# Patient Record
Sex: MALE | Race: WHITE | NOT HISPANIC OR LATINO | URBAN - METROPOLITAN AREA
[De-identification: names, ages, dates, MRNs, and addresses within clinical notes are randomized per-mention and may not be internally consistent; named-entity substitution may affect disease eponyms.]

---

## 2019-10-10 ENCOUNTER — OUTPATIENT (OUTPATIENT)
Dept: OUTPATIENT SERVICES | Facility: HOSPITAL | Age: 74
LOS: 1 days | End: 2019-10-10
Payer: MEDICARE

## 2019-10-10 ENCOUNTER — APPOINTMENT (OUTPATIENT)
Dept: MRI IMAGING | Facility: CLINIC | Age: 74
End: 2019-10-10
Payer: MEDICARE

## 2019-10-10 DIAGNOSIS — Z98.89 OTHER SPECIFIED POSTPROCEDURAL STATES: Chronic | ICD-10-CM

## 2019-10-10 DIAGNOSIS — Z90.81 ACQUIRED ABSENCE OF SPLEEN: Chronic | ICD-10-CM

## 2019-10-10 DIAGNOSIS — Z00.8 ENCOUNTER FOR OTHER GENERAL EXAMINATION: ICD-10-CM

## 2019-10-10 PROCEDURE — 72197 MRI PELVIS W/O & W/DYE: CPT | Mod: 26

## 2019-10-10 PROCEDURE — A9585: CPT

## 2019-10-10 PROCEDURE — 72197 MRI PELVIS W/O & W/DYE: CPT

## 2020-05-18 ENCOUNTER — APPOINTMENT (OUTPATIENT)
Dept: PULMONOLOGY | Facility: CLINIC | Age: 75
End: 2020-05-18
Payer: MEDICARE

## 2020-05-18 VITALS
BODY MASS INDEX: 37.61 KG/M2 | WEIGHT: 234 LBS | HEIGHT: 66 IN | HEART RATE: 78 BPM | SYSTOLIC BLOOD PRESSURE: 140 MMHG | DIASTOLIC BLOOD PRESSURE: 80 MMHG | OXYGEN SATURATION: 95 %

## 2020-05-18 DIAGNOSIS — Z86.39 PERSONAL HISTORY OF OTHER ENDOCRINE, NUTRITIONAL AND METABOLIC DISEASE: ICD-10-CM

## 2020-05-18 PROCEDURE — 99204 OFFICE O/P NEW MOD 45 MIN: CPT

## 2020-05-18 RX ORDER — TAMSULOSIN HYDROCHLORIDE 0.4 MG/1
0.4 CAPSULE ORAL
Refills: 0 | Status: ACTIVE | COMMUNITY

## 2020-05-18 RX ORDER — FLUTICASONE FUROATE AND VILANTEROL TRIFENATATE 100; 25 UG/1; UG/1
100-25 POWDER RESPIRATORY (INHALATION)
Refills: 0 | Status: ACTIVE | COMMUNITY

## 2020-05-18 RX ORDER — ATORVASTATIN CALCIUM 40 MG/1
40 TABLET, FILM COATED ORAL
Refills: 0 | Status: ACTIVE | COMMUNITY

## 2020-05-18 RX ORDER — ALBUTEROL SULFATE 90 UG/1
108 (90 BASE) AEROSOL, METERED RESPIRATORY (INHALATION)
Refills: 0 | Status: ACTIVE | COMMUNITY

## 2020-05-18 RX ORDER — UBIDECARENONE 100 MG
100 CAPSULE ORAL
Refills: 0 | Status: ACTIVE | COMMUNITY

## 2020-05-18 RX ORDER — METFORMIN HYDROCHLORIDE 500 MG/1
500 TABLET, COATED ORAL
Refills: 0 | Status: ACTIVE | COMMUNITY

## 2020-06-02 ENCOUNTER — OUTPATIENT (OUTPATIENT)
Dept: OUTPATIENT SERVICES | Facility: HOSPITAL | Age: 75
LOS: 1 days | End: 2020-06-02
Payer: MEDICARE

## 2020-06-02 DIAGNOSIS — Z98.89 OTHER SPECIFIED POSTPROCEDURAL STATES: Chronic | ICD-10-CM

## 2020-06-02 DIAGNOSIS — G47.33 OBSTRUCTIVE SLEEP APNEA (ADULT) (PEDIATRIC): ICD-10-CM

## 2020-06-02 DIAGNOSIS — Z90.81 ACQUIRED ABSENCE OF SPLEEN: Chronic | ICD-10-CM

## 2020-06-02 PROCEDURE — 95806 SLEEP STUDY UNATT&RESP EFFT: CPT | Mod: 26

## 2020-06-02 PROCEDURE — 95806 SLEEP STUDY UNATT&RESP EFFT: CPT

## 2020-06-02 PROCEDURE — G0399: CPT

## 2020-06-09 ENCOUNTER — APPOINTMENT (OUTPATIENT)
Dept: PULMONOLOGY | Facility: CLINIC | Age: 75
End: 2020-06-09
Payer: MEDICARE

## 2020-06-09 VITALS — DIASTOLIC BLOOD PRESSURE: 70 MMHG | WEIGHT: 231 LBS | BODY MASS INDEX: 37.28 KG/M2 | SYSTOLIC BLOOD PRESSURE: 132 MMHG

## 2020-06-09 VITALS — OXYGEN SATURATION: 95 % | HEART RATE: 76 BPM

## 2020-06-09 PROCEDURE — 99214 OFFICE O/P EST MOD 30 MIN: CPT

## 2020-08-12 ENCOUNTER — APPOINTMENT (OUTPATIENT)
Dept: PULMONOLOGY | Facility: CLINIC | Age: 75
End: 2020-08-12
Payer: MEDICARE

## 2020-08-12 VITALS — HEART RATE: 89 BPM | OXYGEN SATURATION: 95 %

## 2020-08-12 VITALS — WEIGHT: 229 LBS | DIASTOLIC BLOOD PRESSURE: 68 MMHG | BODY MASS INDEX: 36.96 KG/M2 | SYSTOLIC BLOOD PRESSURE: 126 MMHG

## 2020-08-12 PROCEDURE — 99214 OFFICE O/P EST MOD 30 MIN: CPT

## 2020-10-15 ENCOUNTER — RECORD ABSTRACTING (OUTPATIENT)
Age: 75
End: 2020-10-15

## 2020-10-15 DIAGNOSIS — Z84.89 FAMILY HISTORY OF OTHER SPECIFIED CONDITIONS: ICD-10-CM

## 2020-10-15 DIAGNOSIS — Z87.19 PERSONAL HISTORY OF OTHER DISEASES OF THE DIGESTIVE SYSTEM: ICD-10-CM

## 2020-10-19 ENCOUNTER — APPOINTMENT (OUTPATIENT)
Dept: PULMONOLOGY | Facility: CLINIC | Age: 75
End: 2020-10-19
Payer: MEDICARE

## 2020-10-19 VITALS — SYSTOLIC BLOOD PRESSURE: 134 MMHG | DIASTOLIC BLOOD PRESSURE: 82 MMHG | BODY MASS INDEX: 37.28 KG/M2 | WEIGHT: 231 LBS

## 2020-10-19 VITALS — HEART RATE: 69 BPM | OXYGEN SATURATION: 95 %

## 2020-10-19 PROCEDURE — 99214 OFFICE O/P EST MOD 30 MIN: CPT

## 2021-01-22 ENCOUNTER — APPOINTMENT (OUTPATIENT)
Dept: PULMONOLOGY | Facility: CLINIC | Age: 76
End: 2021-01-22
Payer: MEDICARE

## 2021-01-22 VITALS
DIASTOLIC BLOOD PRESSURE: 70 MMHG | SYSTOLIC BLOOD PRESSURE: 130 MMHG | HEART RATE: 74 BPM | WEIGHT: 228 LBS | BODY MASS INDEX: 36.64 KG/M2 | OXYGEN SATURATION: 96 % | HEIGHT: 66 IN

## 2021-01-22 PROCEDURE — 99214 OFFICE O/P EST MOD 30 MIN: CPT

## 2021-04-09 NOTE — REASON FOR VISIT
[Home] : at home, [unfilled] , at the time of the visit. [Medical Office: ([facility])___] : at the medical office located in  [Verbal consent obtained from patient] : the patient, [unfilled] [Routine Follow-Up] : a routine follow-up visit for prostate cancer

## 2021-04-13 RX ORDER — OXYBUTYNIN CHLORIDE 10 MG/1
10 TABLET, EXTENDED RELEASE ORAL
Refills: 0 | Status: DISCONTINUED | COMMUNITY
Start: 2021-01-22 | End: 2021-04-13

## 2021-04-13 NOTE — REVIEW OF SYSTEMS
[Hematuria: Grade 0] : Hematuria: Grade 0 [Urinary Incontinence: Grade 0] : Urinary Incontinence: Grade 0  [Urinary Retention: Grade 0] : Urinary Retention: Grade 0 [Urinary Tract Pain: Grade 0] : Urinary Tract Pain: Grade 0 [Urinary Urgency: Grade 1 - Present] : Urinary Urgency: Grade 1 - Present [Urinary Frequency: Grade 1 - Present] : Urinary Frequency: Grade 1 - Present

## 2021-04-14 ENCOUNTER — APPOINTMENT (OUTPATIENT)
Dept: RADIATION ONCOLOGY | Facility: CLINIC | Age: 76
End: 2021-04-14
Payer: MEDICARE

## 2021-04-14 PROCEDURE — 99213 OFFICE O/P EST LOW 20 MIN: CPT | Mod: 95

## 2021-04-14 NOTE — DISEASE MANAGEMENT
[1] : T1 [c] : c [0] : M0 [0-10] : 0 -10 ng/mL [6] : [place] Score 6 [I] : I [Radiation Therapy] : Radiation Therapy [Treatment with radiation therapy] : Treatment with radiation therapy [BiopsyDate] : 2016 [TotalCores] : 12 [TotalPositiveCores] : 4 [RadiationCompletedDate] : 10/19 [EBRTFractions] : 5

## 2021-04-14 NOTE — HISTORY OF PRESENT ILLNESS
[FreeTextEntry1] :   This 74 year old male was initially diagnosed with carcinoma of the prostate in 2012. At that time a biopsy showed adenocarcinoma, [place] Score 2+3. He chose active surveillance. A repeat biopsy was done in 2016 when his PSA som to 4. 46. Then 4/12 cores were positive for [place] 6 cancer. Now his PSA has once again risen, to 6. 3 ng/ml. Definitive radiation therapy was prescribed; treated with Cyberknife completed 10-25-19. \par \par PSA on 2-21-20 was 2. 4\par PSA on 6-3-20  was 2.42\par PSA on 10-14-20 was 2.24\par \par He presents for a one year 6 month telehealth follow up. Patient currently reports feeling well. Urinary frequency and nocturia noted. He is taking Flomax 0.8 mg qhs. Patietn has diabetes.  Urinary urgency noted. Denies hematuria or dysuria. Denies GI symptoms. Fatigue noted. Appetite is good. He recently had his PSA drawn with his urologist (which was 1.86 as per patient) and states he is seeing his PCP next week and will be having his PSA drawn again. PCP to fax PSA results to our office.  Patient received covid vaccine in February 2021.\par \par

## 2021-07-20 ENCOUNTER — APPOINTMENT (OUTPATIENT)
Dept: PULMONOLOGY | Facility: CLINIC | Age: 76
End: 2021-07-20
Payer: MEDICARE

## 2021-07-20 VITALS — HEART RATE: 66 BPM | DIASTOLIC BLOOD PRESSURE: 82 MMHG | SYSTOLIC BLOOD PRESSURE: 140 MMHG | OXYGEN SATURATION: 95 %

## 2021-07-20 PROCEDURE — 99214 OFFICE O/P EST MOD 30 MIN: CPT

## 2021-10-09 DIAGNOSIS — Z01.818 ENCOUNTER FOR OTHER PREPROCEDURAL EXAMINATION: ICD-10-CM

## 2021-10-10 ENCOUNTER — APPOINTMENT (OUTPATIENT)
Dept: DISASTER EMERGENCY | Facility: CLINIC | Age: 76
End: 2021-10-10

## 2021-10-11 LAB — SARS-COV-2 N GENE NPH QL NAA+PROBE: NOT DETECTED

## 2021-10-13 ENCOUNTER — APPOINTMENT (OUTPATIENT)
Dept: RADIATION ONCOLOGY | Facility: CLINIC | Age: 76
End: 2021-10-13

## 2021-10-13 ENCOUNTER — APPOINTMENT (OUTPATIENT)
Dept: RADIATION ONCOLOGY | Facility: CLINIC | Age: 76
End: 2021-10-13
Payer: MEDICARE

## 2021-10-13 VITALS
DIASTOLIC BLOOD PRESSURE: 70 MMHG | WEIGHT: 233 LBS | SYSTOLIC BLOOD PRESSURE: 140 MMHG | BODY MASS INDEX: 37.45 KG/M2 | HEIGHT: 66 IN

## 2021-10-13 DIAGNOSIS — C61 MALIGNANT NEOPLASM OF PROSTATE: ICD-10-CM

## 2021-10-13 PROCEDURE — 99213 OFFICE O/P EST LOW 20 MIN: CPT

## 2021-10-13 NOTE — DISEASE MANAGEMENT
[BiopsyDate] : 2016 [TotalCores] : 12 [TotalPositiveCores] : 4 [RadiationCompletedDate] : 10/19 [EBRTFractions] : 5

## 2021-10-13 NOTE — HISTORY OF PRESENT ILLNESS
[FreeTextEntry1] :   This 75 year old male was initially diagnosed with carcinoma of the prostate in 2012. At that time a biopsy showed adenocarcinoma, [place] Score 2+3. He chose active surveillance. A repeat biopsy was done in 2016 when his PSA som to 4. 46. Then 4/12 cores were positive for [place] 6 cancer. Now his PSA has once again risen, to 6. 3 ng/ml. Definitive radiation therapy was prescribed; treated with Cyberknife completed a total dose of 3500 cGy in 5 fx on 10-25-19. \par \par PSA on 2-21-20 was 2. 4\par PSA on 6-3-20  was 2.42\par PSA on 10-14-20 was 2.24\par PSA on 4-20-21 was 2.00 (drawn by PCP)\par \par He presents for a 2 year follow up. Patient currently reports feeling well. Urinary frequency noted. Reports nocturia x 1. Urinary urgency noted. Denies hematuria or dysuria. Denies GI symptoms. He is taking Flomax 0.8 mg qhs. Patient has diabetes. Fatigue noted. Appetite is good. Patient received covid vaccine in February 2021 and recently received his booster vaccine. \par \par

## 2021-10-13 NOTE — REVIEW OF SYSTEMS
[IPSS Score (0-40): ___] : IPSS score: [unfilled] [EPIC-CP Score (0-60): ___] : EPIC-CP score: [unfilled] [Hematuria: Grade 0] : Hematuria: Grade 0 [Urinary Incontinence: Grade 0] : Urinary Incontinence: Grade 0  [Urinary Retention: Grade 0] : Urinary Retention: Grade 0 [Urinary Tract Pain: Grade 0] : Urinary Tract Pain: Grade 0 [Urinary Urgency: Grade 1 - Present] : Urinary Urgency: Grade 1 - Present [Urinary Frequency: Grade 1 - Present] : Urinary Frequency: Grade 1 - Present

## 2021-10-14 ENCOUNTER — OUTPATIENT (OUTPATIENT)
Dept: OUTPATIENT SERVICES | Facility: HOSPITAL | Age: 76
LOS: 1 days | End: 2021-10-14
Payer: MEDICARE

## 2021-10-14 ENCOUNTER — APPOINTMENT (OUTPATIENT)
Age: 76
End: 2021-10-14
Payer: MEDICARE

## 2021-10-14 DIAGNOSIS — Z90.81 ACQUIRED ABSENCE OF SPLEEN: Chronic | ICD-10-CM

## 2021-10-14 DIAGNOSIS — Z98.89 OTHER SPECIFIED POSTPROCEDURAL STATES: Chronic | ICD-10-CM

## 2021-10-14 DIAGNOSIS — G47.33 OBSTRUCTIVE SLEEP APNEA (ADULT) (PEDIATRIC): ICD-10-CM

## 2021-10-14 PROCEDURE — 95811 POLYSOM 6/>YRS CPAP 4/> PARM: CPT | Mod: 26

## 2021-10-14 PROCEDURE — 95811 POLYSOM 6/>YRS CPAP 4/> PARM: CPT

## 2021-10-18 LAB — PSA SERPL-MCNC: 1.83 NG/ML

## 2021-11-09 ENCOUNTER — APPOINTMENT (OUTPATIENT)
Dept: PULMONOLOGY | Facility: CLINIC | Age: 76
End: 2021-11-09
Payer: MEDICARE

## 2021-11-09 PROCEDURE — 99443: CPT | Mod: 95

## 2022-02-02 ENCOUNTER — APPOINTMENT (OUTPATIENT)
Dept: PULMONOLOGY | Facility: CLINIC | Age: 77
End: 2022-02-02
Payer: MEDICARE

## 2022-02-02 VITALS
RESPIRATION RATE: 16 BRPM | WEIGHT: 232 LBS | BODY MASS INDEX: 36.41 KG/M2 | HEIGHT: 67 IN | DIASTOLIC BLOOD PRESSURE: 86 MMHG | SYSTOLIC BLOOD PRESSURE: 138 MMHG | HEART RATE: 68 BPM | OXYGEN SATURATION: 97 %

## 2022-02-02 PROCEDURE — 99214 OFFICE O/P EST MOD 30 MIN: CPT

## 2022-05-04 ENCOUNTER — APPOINTMENT (OUTPATIENT)
Dept: PULMONOLOGY | Facility: CLINIC | Age: 77
End: 2022-05-04
Payer: MEDICARE

## 2022-05-04 VITALS — BODY MASS INDEX: 36.96 KG/M2 | HEIGHT: 66 IN | WEIGHT: 230 LBS

## 2022-05-04 VITALS
SYSTOLIC BLOOD PRESSURE: 142 MMHG | HEART RATE: 64 BPM | OXYGEN SATURATION: 96 % | RESPIRATION RATE: 16 BRPM | DIASTOLIC BLOOD PRESSURE: 78 MMHG

## 2022-05-04 PROCEDURE — 85018 HEMOGLOBIN: CPT | Mod: QW

## 2022-05-04 PROCEDURE — 94729 DIFFUSING CAPACITY: CPT

## 2022-05-04 PROCEDURE — 94010 BREATHING CAPACITY TEST: CPT

## 2022-05-04 PROCEDURE — 94727 GAS DIL/WSHOT DETER LNG VOL: CPT

## 2022-05-04 PROCEDURE — 99214 OFFICE O/P EST MOD 30 MIN: CPT | Mod: 25

## 2022-10-12 ENCOUNTER — APPOINTMENT (OUTPATIENT)
Dept: RADIATION ONCOLOGY | Facility: CLINIC | Age: 77
End: 2022-10-12

## 2022-10-12 ENCOUNTER — NON-APPOINTMENT (OUTPATIENT)
Age: 77
End: 2022-10-12

## 2022-10-12 VITALS
SYSTOLIC BLOOD PRESSURE: 138 MMHG | WEIGHT: 232 LBS | HEIGHT: 66 IN | BODY MASS INDEX: 37.28 KG/M2 | DIASTOLIC BLOOD PRESSURE: 88 MMHG

## 2022-10-12 PROCEDURE — 99214 OFFICE O/P EST MOD 30 MIN: CPT

## 2022-10-12 NOTE — HISTORY OF PRESENT ILLNESS
[FreeTextEntry1] :   This 76 year old male was initially diagnosed with carcinoma of the prostate in 2012. At that time a biopsy showed adenocarcinoma, [place] Score 2+3. He chose active surveillance. A repeat biopsy was done in 2016 when his PSA som to 4. 46. Then 4/12 cores were positive for [place] 6 cancer. Now his PSA has once again risen, to 6. 3 ng/ml. Definitive radiation therapy was prescribed; treated with Cyberknife completed a total dose of 3500 cGy in 5 fx on 10-25-19 under the direction of Dr. Feliberto Borja. \par \par PSA on 2-21-20 was 2. 4\par PSA on 6-3-20  was 2.42\par PSA on 10-14-20 was 2.24\par PSA on 4-20-21 was 2.00 (drawn by PCP)\par PSA on 10-18-21 was 1.83\par PSA in 03/2022 was 0.63 \par PSA in 09/2022 was 0.40 \par \par He presents for a 3 year follow up. Patient currently reports feeling well. Urinary frequency noted. Reports nocturia x 1. Urinary urgency noted. Denies hematuria or dysuria. Denies GI symptoms. He is taking Flomax 0.8 mg qhs. Patient has diabetes. Appetite and energy levels are good. He is up to date on his covid vaccinations. \par \par

## 2022-11-04 ENCOUNTER — APPOINTMENT (OUTPATIENT)
Dept: PULMONOLOGY | Facility: CLINIC | Age: 77
End: 2022-11-04

## 2022-11-04 VITALS
HEIGHT: 66 IN | SYSTOLIC BLOOD PRESSURE: 130 MMHG | HEART RATE: 64 BPM | OXYGEN SATURATION: 97 % | BODY MASS INDEX: 37.45 KG/M2 | WEIGHT: 233 LBS | DIASTOLIC BLOOD PRESSURE: 72 MMHG | RESPIRATION RATE: 16 BRPM

## 2022-11-04 PROCEDURE — 99214 OFFICE O/P EST MOD 30 MIN: CPT

## 2022-11-04 NOTE — HISTORY OF PRESENT ILLNESS
[Former] : former [TextBox_4] : Hx COPD. \par \par On breo. Never needs albuterol. \par \par At baseline.  Difficulty with heavy exertion but he does not feel breathing is problem; more stamina, fatigue issue. Not needing rescue inhalers. \par \par COVID on 1/9/22; found on home rapid test; he got chills. Not hospitalized. Mild case. Now back to baseline. \par \par F/U ct chest done 5/20/20 w/o change c/w 2018.\par \par Has issues with nasal congestion. Has an ENT. \par \par Very severe ANDREW. Therapeutic AHI on  max CPAP was elevated. Went for BPAP titration 10/14. Even on max pressure tested that night, still with ANDREW. Has AutoBPAP 25/16. Doing very well on autoBPAP; therapeutic AHI 5.2  Goes to bed 9p; OOB 6a. Up frequently throughout the night. \par \par No fever. No CP.\par \par Hx prostate CA; treated with cyberknife.\par \par Sees Dr Perla for cardio. No dz noted.\par \par  [YearQuit] : 2015 [Home] : home [BPAP:] : BPAP [TextBox_100] : 6/2/20 [TextBox_108] : 79.4 [TextBox_104] : 10/14/21 [TextBox_135] : Auto BPAP Max Ipap 25; min EPAP 16; PS 4 [TextBox_137] : 100 [TextBox_147] : 3.8

## 2022-11-04 NOTE — REVIEW OF SYSTEMS
[Obesity] : obesity [Negative] : Psychiatric [TextBox_3] : per HPI [TextBox_30] : per HPI [TextBox_44] : per HPI

## 2022-11-04 NOTE — PHYSICAL EXAM
[No Acute Distress] : no acute distress [Low Lying Soft Palate] : low lying soft palate [Elongated Uvula] : elongated uvula [Enlarged Base of the Tongue] : enlarged base of the tongue [Supple] : supple [Normal Rate/Rhythm] : normal rate/rhythm [Normal S1, S2] : normal s1, s2 [No Resp Distress] : no resp distress [No Acc Muscle Use] : no acc muscle use [Normal Rhythm and Effort] : normal rhythm and effort [Clear to Auscultation Bilaterally] : clear to auscultation bilaterally [Benign] : benign [Not Tender] : not tender [Normal Gait] : normal gait [No Clubbing] : no clubbing [No Cyanosis] : no cyanosis [No Edema] : no edema [FROM] : FROM [Normal Color/ Pigmentation] : normal color/ pigmentation [No Focal Deficits] : no focal deficits [Oriented x3] : oriented x3 [Normal Affect] : normal affect [TextBox_89] : obese

## 2023-05-04 ENCOUNTER — APPOINTMENT (OUTPATIENT)
Dept: PULMONOLOGY | Facility: CLINIC | Age: 78
End: 2023-05-04
Payer: MEDICARE

## 2023-05-04 VITALS
BODY MASS INDEX: 37.77 KG/M2 | OXYGEN SATURATION: 96 % | SYSTOLIC BLOOD PRESSURE: 128 MMHG | WEIGHT: 235 LBS | DIASTOLIC BLOOD PRESSURE: 62 MMHG | HEART RATE: 69 BPM | HEIGHT: 66 IN | RESPIRATION RATE: 16 BRPM

## 2023-05-04 DIAGNOSIS — G47.33 OBSTRUCTIVE SLEEP APNEA (ADULT) (PEDIATRIC): ICD-10-CM

## 2023-05-04 PROCEDURE — 99214 OFFICE O/P EST MOD 30 MIN: CPT | Mod: 25

## 2023-05-04 PROCEDURE — G0296 VISIT TO DETERM LDCT ELIG: CPT

## 2023-05-04 NOTE — HISTORY OF PRESENT ILLNESS
[Former] : former [>= 20 pack years] : >= 20 pack years [TextBox_4] : Hx COPD. \par \par On breo. Never needs albuterol. \par \par At baseline.  Difficulty with heavy exertion but he does not feel breathing is problem; more stamina, fatigue issue. Not needing rescue inhalers. \par \par F/U ct chest done 5/20/20 w/o change c/w 2018.\par \par Has issues with nasal congestion. Has an ENT. \par \par Very severe ANDREW. Therapeutic AHI on  max CPAP was elevated. Went for BPAP titration 10/14. Even on max pressure tested that night, still with ANDREW. Has AutoBPAP 25/16. Doing very well on autoBPAP; therapeutic AHI 4.9.  Goes to bed 9p; OOB 6a. Up frequently throughout the night. \par \par No fever. No CP.\par \par Hx prostate CA; treated with cyberknife.\par \par Sees Dr Perla for cardio. No dz noted.\par \par  [YearQuit] : 2014 [Home] : home [BPAP:] : BPAP [TextBox_100] : 6/2/20 [TextBox_108] : 79.4 [TextBox_104] : 10/14/21 [TextBox_135] : Auto BPAP Max Ipap 25; min EPAP 16; PS 4 [TextBox_137] : 100 [TextBox_147] : 4.9

## 2023-05-04 NOTE — COUNSELING
[Potential consequences of obesity discussed] : Potential consequences of obesity discussed [Benefits of weight loss discussed] : Benefits of weight loss discussed [ - Annual Lung Cancer Screening/Share Decision Making Discussion] : Annual Lung Cancer Screening/Share Decision Making Discussion. (I have advised this patient to have a Low Dose CT (LDCT) scan of the lungs and have discussed the following with the patient in a shared decision making discussion:   Benefits of Detection and Early Treatment: There is adequate evidence that annual screening for lung cancer with LDCT in a population of high-risk persons can prevent a substantial number of lung cancer–related deaths. The magnitude of benefit depends on the individual patient's risk for lung cancer, as those who are at highest risk are most likely to benefit. Screening cannot prevent most lung cancer–related deaths, and does not replace smoking cessation. Harms of Detection and Early Intervention and Treatment: The harms associated with LDCT screening include false-negative and false-positive results, incidental findings, over diagnosis, and radiation exposure. False-positive LDCT results occur in a substantial proportion of screened persons; 95% of all positive results do not lead to a diagnosis of cancer. In a high-quality screening program, further imaging can resolve most false-positive results; however, some patients may require invasive procedures. Radiation harms, including cancer resulting from cumulative exposure to radiation, vary depending on the age at the start of screening; the number of scans received; and the person's exposure to other sources of radiation, particularly other medical imaging.)

## 2023-10-18 ENCOUNTER — NON-APPOINTMENT (OUTPATIENT)
Age: 78
End: 2023-10-18

## 2023-10-18 ENCOUNTER — APPOINTMENT (OUTPATIENT)
Dept: RADIATION ONCOLOGY | Facility: CLINIC | Age: 78
End: 2023-10-18
Payer: MEDICARE

## 2023-10-18 VITALS
WEIGHT: 227 LBS | HEART RATE: 62 BPM | DIASTOLIC BLOOD PRESSURE: 68 MMHG | SYSTOLIC BLOOD PRESSURE: 122 MMHG | BODY MASS INDEX: 36.48 KG/M2 | RESPIRATION RATE: 18 BRPM | HEIGHT: 66 IN | OXYGEN SATURATION: 94 %

## 2023-10-18 PROCEDURE — 99213 OFFICE O/P EST LOW 20 MIN: CPT

## 2023-11-08 ENCOUNTER — APPOINTMENT (OUTPATIENT)
Dept: PULMONOLOGY | Facility: CLINIC | Age: 78
End: 2023-11-08
Payer: MEDICARE

## 2023-11-08 VITALS
BODY MASS INDEX: 36.16 KG/M2 | WEIGHT: 225 LBS | HEART RATE: 60 BPM | SYSTOLIC BLOOD PRESSURE: 122 MMHG | DIASTOLIC BLOOD PRESSURE: 64 MMHG | OXYGEN SATURATION: 95 % | HEIGHT: 66 IN | RESPIRATION RATE: 16 BRPM

## 2023-11-08 PROCEDURE — 99214 OFFICE O/P EST MOD 30 MIN: CPT

## 2024-05-22 ENCOUNTER — APPOINTMENT (OUTPATIENT)
Dept: PULMONOLOGY | Facility: CLINIC | Age: 79
End: 2024-05-22
Payer: MEDICARE

## 2024-05-22 VITALS — WEIGHT: 222 LBS | HEIGHT: 65.5 IN | BODY MASS INDEX: 36.54 KG/M2

## 2024-05-22 VITALS
SYSTOLIC BLOOD PRESSURE: 160 MMHG | RESPIRATION RATE: 16 BRPM | DIASTOLIC BLOOD PRESSURE: 64 MMHG | OXYGEN SATURATION: 96 % | HEART RATE: 57 BPM

## 2024-05-22 VITALS — SYSTOLIC BLOOD PRESSURE: 152 MMHG | DIASTOLIC BLOOD PRESSURE: 64 MMHG

## 2024-05-22 DIAGNOSIS — R91.1 SOLITARY PULMONARY NODULE: ICD-10-CM

## 2024-05-22 DIAGNOSIS — Z87.891 PERSONAL HISTORY OF NICOTINE DEPENDENCE: ICD-10-CM

## 2024-05-22 DIAGNOSIS — E66.9 OBESITY, UNSPECIFIED: ICD-10-CM

## 2024-05-22 DIAGNOSIS — J43.9 EMPHYSEMA, UNSPECIFIED: ICD-10-CM

## 2024-05-22 DIAGNOSIS — R06.09 OTHER FORMS OF DYSPNEA: ICD-10-CM

## 2024-05-22 DIAGNOSIS — G47.33 OBSTRUCTIVE SLEEP APNEA (ADULT) (PEDIATRIC): ICD-10-CM

## 2024-05-22 DIAGNOSIS — J44.9 CHRONIC OBSTRUCTIVE PULMONARY DISEASE, UNSPECIFIED: ICD-10-CM

## 2024-05-22 PROCEDURE — 99214 OFFICE O/P EST MOD 30 MIN: CPT | Mod: 25

## 2024-05-22 PROCEDURE — 94010 BREATHING CAPACITY TEST: CPT

## 2024-05-22 NOTE — PLAN
[TextEntry] : Continue with breo. Albuterol (ventolin) prn.  Recc pulm rehab but he does not want to go.  Continue autoBPAP. Likes [facility]. Reviewed use of bpap for ANDREW. LDCT 2025. Weight loss a must. Cardiology f/u.  Annual flu vaccine. All questions answered.

## 2024-05-22 NOTE — HISTORY OF PRESENT ILLNESS
[Former] : former [>= 20 pack years] : >= 20 pack years [Home] : home [BPAP:] : BPAP [TextBox_4] : Hx COPD.   On breo. Never needs albuterol.   At baseline.  Feels sob not a problem. No wheeze, cough.    F/U ct chest done 4/23/24 reviewed below.    Has issues with nasal congestion. Has an ENT.   Very severe ANDREW. Therapeutic AHI on  max CPAP was elevated. Went for BPAP titration 10/14. Even on max pressure tested that night, still with ANDREW. Has AutoBPAP 25/16. Doing very well on autoBPAP; therapeutic AHI WNL.     No fever. No CP.  Hx prostate CA; treated with cyberknife.  Sees Dr Perla for cardio. No dz noted.   [YearQuit] : 2014 [TextBox_100] : 6/2/20 [TextBox_108] : 79.4 [TextBox_104] : 10/14/21 [TextBox_135] : Auto BPAP Max Ipap 25; min EPAP 16; PS 4 [TextBox_137] : 100 [TextBox_147] : 3.5 [TextBox_158] : [employer]

## 2024-05-22 NOTE — PROCEDURE
[FreeTextEntry1] : compliance reviewed  ila today 5/22/24: obstruction in the mod range; improved FEV1 from 5/2022 ------  ct done 4/23/24 reviewed

## 2024-10-23 ENCOUNTER — APPOINTMENT (OUTPATIENT)
Dept: RADIATION ONCOLOGY | Facility: CLINIC | Age: 79
End: 2024-10-23
Payer: MEDICARE

## 2024-10-23 VITALS
WEIGHT: 218 LBS | SYSTOLIC BLOOD PRESSURE: 163 MMHG | BODY MASS INDEX: 35.03 KG/M2 | OXYGEN SATURATION: 96 % | RESPIRATION RATE: 16 BRPM | HEIGHT: 66 IN | DIASTOLIC BLOOD PRESSURE: 74 MMHG | HEART RATE: 59 BPM

## 2024-10-23 VITALS — DIASTOLIC BLOOD PRESSURE: 82 MMHG | SYSTOLIC BLOOD PRESSURE: 136 MMHG

## 2024-10-23 PROCEDURE — 99213 OFFICE O/P EST LOW 20 MIN: CPT

## 2024-10-23 PROCEDURE — G2211 COMPLEX E/M VISIT ADD ON: CPT

## 2024-11-11 ENCOUNTER — APPOINTMENT (OUTPATIENT)
Dept: UROLOGY | Facility: CLINIC | Age: 79
End: 2024-11-11
Payer: MEDICARE

## 2024-11-11 VITALS
TEMPERATURE: 98.5 F | HEIGHT: 66 IN | DIASTOLIC BLOOD PRESSURE: 76 MMHG | BODY MASS INDEX: 34.87 KG/M2 | RESPIRATION RATE: 16 BRPM | HEART RATE: 67 BPM | OXYGEN SATURATION: 95 % | SYSTOLIC BLOOD PRESSURE: 156 MMHG | WEIGHT: 217 LBS

## 2024-11-11 DIAGNOSIS — Z87.891 PERSONAL HISTORY OF NICOTINE DEPENDENCE: ICD-10-CM

## 2024-11-11 DIAGNOSIS — Z80.9 FAMILY HISTORY OF MALIGNANT NEOPLASM, UNSPECIFIED: ICD-10-CM

## 2024-11-11 DIAGNOSIS — Z85.46 PERSONAL HISTORY OF MALIGNANT NEOPLASM OF PROSTATE: ICD-10-CM

## 2024-11-11 DIAGNOSIS — Z87.19 PERSONAL HISTORY OF OTHER DISEASES OF THE DIGESTIVE SYSTEM: ICD-10-CM

## 2024-11-11 DIAGNOSIS — R35.0 FREQUENCY OF MICTURITION: ICD-10-CM

## 2024-11-11 PROCEDURE — 51798 US URINE CAPACITY MEASURE: CPT

## 2024-11-11 PROCEDURE — 99204 OFFICE O/P NEW MOD 45 MIN: CPT

## 2024-11-11 PROCEDURE — 51741 ELECTRO-UROFLOWMETRY FIRST: CPT

## 2024-11-12 LAB
APPEARANCE: CLEAR
BACTERIA: NEGATIVE /HPF
BILIRUBIN URINE: NEGATIVE
BLOOD URINE: NEGATIVE
CAST: 0 /LPF
COLOR: YELLOW
EPITHELIAL CELLS: 0 /HPF
GLUCOSE QUALITATIVE U: NEGATIVE MG/DL
KETONES URINE: NEGATIVE MG/DL
LEUKOCYTE ESTERASE URINE: ABNORMAL
MICROSCOPIC-UA: NORMAL
NITRITE URINE: NEGATIVE
PH URINE: 5.5
PROTEIN URINE: 100 MG/DL
RED BLOOD CELLS URINE: 0 /HPF
SPECIFIC GRAVITY URINE: 1.02
UROBILINOGEN URINE: 0.2 MG/DL
WHITE BLOOD CELLS URINE: 0 /HPF

## 2024-11-13 LAB — BACTERIA UR CULT: NORMAL

## 2024-11-18 LAB — URINE CYTOLOGY: NORMAL

## 2024-11-19 ENCOUNTER — APPOINTMENT (OUTPATIENT)
Dept: PULMONOLOGY | Facility: CLINIC | Age: 79
End: 2024-11-19
Payer: MEDICARE

## 2024-11-19 VITALS
SYSTOLIC BLOOD PRESSURE: 130 MMHG | WEIGHT: 219 LBS | OXYGEN SATURATION: 96 % | BODY MASS INDEX: 35.2 KG/M2 | RESPIRATION RATE: 16 BRPM | HEART RATE: 72 BPM | HEIGHT: 66 IN | DIASTOLIC BLOOD PRESSURE: 64 MMHG

## 2024-11-19 DIAGNOSIS — E66.9 OBESITY, UNSPECIFIED: ICD-10-CM

## 2024-11-19 DIAGNOSIS — G47.33 OBSTRUCTIVE SLEEP APNEA (ADULT) (PEDIATRIC): ICD-10-CM

## 2024-11-19 DIAGNOSIS — J43.9 EMPHYSEMA, UNSPECIFIED: ICD-10-CM

## 2024-11-19 DIAGNOSIS — J44.9 CHRONIC OBSTRUCTIVE PULMONARY DISEASE, UNSPECIFIED: ICD-10-CM

## 2024-11-19 DIAGNOSIS — R91.1 SOLITARY PULMONARY NODULE: ICD-10-CM

## 2024-11-19 DIAGNOSIS — Z87.891 PERSONAL HISTORY OF NICOTINE DEPENDENCE: ICD-10-CM

## 2024-11-19 PROCEDURE — G2211 COMPLEX E/M VISIT ADD ON: CPT

## 2024-11-19 PROCEDURE — 99214 OFFICE O/P EST MOD 30 MIN: CPT

## 2025-03-24 ENCOUNTER — APPOINTMENT (OUTPATIENT)
Dept: UROLOGY | Facility: CLINIC | Age: 80
End: 2025-03-24
Payer: MEDICARE

## 2025-03-24 VITALS
HEART RATE: 61 BPM | HEIGHT: 66 IN | BODY MASS INDEX: 34.72 KG/M2 | WEIGHT: 216 LBS | DIASTOLIC BLOOD PRESSURE: 65 MMHG | OXYGEN SATURATION: 95 % | SYSTOLIC BLOOD PRESSURE: 161 MMHG

## 2025-03-24 DIAGNOSIS — C61 MALIGNANT NEOPLASM OF PROSTATE: ICD-10-CM

## 2025-03-24 DIAGNOSIS — R35.0 FREQUENCY OF MICTURITION: ICD-10-CM

## 2025-03-24 DIAGNOSIS — R39.15 URGENCY OF URINATION: ICD-10-CM

## 2025-03-24 PROCEDURE — 99214 OFFICE O/P EST MOD 30 MIN: CPT

## 2025-03-24 PROCEDURE — 51741 ELECTRO-UROFLOWMETRY FIRST: CPT

## 2025-03-24 PROCEDURE — 51798 US URINE CAPACITY MEASURE: CPT

## 2025-04-01 ENCOUNTER — APPOINTMENT (OUTPATIENT)
Dept: UROLOGY | Facility: CLINIC | Age: 80
End: 2025-04-01
Payer: MEDICARE

## 2025-04-01 VITALS
HEART RATE: 56 BPM | DIASTOLIC BLOOD PRESSURE: 75 MMHG | SYSTOLIC BLOOD PRESSURE: 160 MMHG | WEIGHT: 216 LBS | TEMPERATURE: 98.5 F | OXYGEN SATURATION: 98 % | BODY MASS INDEX: 34.72 KG/M2 | HEIGHT: 66 IN

## 2025-04-01 DIAGNOSIS — D49.4 NEOPLASM OF UNSPECIFIED BEHAVIOR OF BLADDER: ICD-10-CM

## 2025-04-01 LAB
BILIRUB UR QL STRIP: NEGATIVE
CLARITY UR: CLEAR
COLLECTION METHOD: NORMAL
GLUCOSE UR-MCNC: NEGATIVE
HCG UR QL: 0.2 EU/DL
HGB UR QL STRIP.AUTO: NEGATIVE
KETONES UR-MCNC: NEGATIVE
LEUKOCYTE ESTERASE UR QL STRIP: NEGATIVE
NITRITE UR QL STRIP: NEGATIVE
PH UR STRIP: 5
PROT UR STRIP-MCNC: NORMAL
SP GR UR STRIP: 1.02

## 2025-04-01 PROCEDURE — 51728 CYSTOMETROGRAM W/VP: CPT

## 2025-04-01 PROCEDURE — 51741 ELECTRO-UROFLOWMETRY FIRST: CPT

## 2025-04-01 PROCEDURE — 51784 ANAL/URINARY MUSCLE STUDY: CPT

## 2025-04-01 PROCEDURE — 52000 CYSTOURETHROSCOPY: CPT

## 2025-04-01 PROCEDURE — 99214 OFFICE O/P EST MOD 30 MIN: CPT | Mod: 25

## 2025-04-01 PROCEDURE — 76872 US TRANSRECTAL: CPT

## 2025-04-01 PROCEDURE — 51797 INTRAABDOMINAL PRESSURE TEST: CPT

## 2025-04-01 RX ORDER — SULFAMETHOXAZOLE AND TRIMETHOPRIM 800; 160 MG/1; MG/1
800-160 TABLET ORAL
Qty: 1 | Refills: 0 | Status: COMPLETED | OUTPATIENT
Start: 2025-04-01 | End: 2025-04-02

## 2025-04-01 RX ORDER — SULFAMETHOXAZOLE AND TRIMETHOPRIM 800; 160 MG/1; MG/1
800-160 TABLET ORAL
Refills: 0 | Status: COMPLETED | OUTPATIENT
Start: 2025-04-01

## 2025-04-01 RX ADMIN — SULFAMETHOXAZOLE AND TRIMETHOPRIM 0 MG: 800; 160 TABLET ORAL at 00:00

## 2025-04-11 ENCOUNTER — OUTPATIENT (OUTPATIENT)
Dept: OUTPATIENT SERVICES | Facility: HOSPITAL | Age: 80
LOS: 1 days | End: 2025-04-11
Payer: MEDICARE

## 2025-04-11 VITALS
RESPIRATION RATE: 16 BRPM | WEIGHT: 201.94 LBS | HEIGHT: 66.5 IN | TEMPERATURE: 97 F | DIASTOLIC BLOOD PRESSURE: 58 MMHG | HEART RATE: 60 BPM | OXYGEN SATURATION: 98 % | SYSTOLIC BLOOD PRESSURE: 145 MMHG

## 2025-04-11 DIAGNOSIS — Z01.818 ENCOUNTER FOR OTHER PREPROCEDURAL EXAMINATION: ICD-10-CM

## 2025-04-11 DIAGNOSIS — C67.9 MALIGNANT NEOPLASM OF BLADDER, UNSPECIFIED: ICD-10-CM

## 2025-04-11 DIAGNOSIS — J44.9 CHRONIC OBSTRUCTIVE PULMONARY DISEASE, UNSPECIFIED: ICD-10-CM

## 2025-04-11 DIAGNOSIS — D49.4 NEOPLASM OF UNSPECIFIED BEHAVIOR OF BLADDER: ICD-10-CM

## 2025-04-11 DIAGNOSIS — E11.9 TYPE 2 DIABETES MELLITUS WITHOUT COMPLICATIONS: ICD-10-CM

## 2025-04-11 DIAGNOSIS — Z90.81 ACQUIRED ABSENCE OF SPLEEN: Chronic | ICD-10-CM

## 2025-04-11 DIAGNOSIS — Z98.89 OTHER SPECIFIED POSTPROCEDURAL STATES: Chronic | ICD-10-CM

## 2025-04-11 DIAGNOSIS — I10 ESSENTIAL (PRIMARY) HYPERTENSION: ICD-10-CM

## 2025-04-11 DIAGNOSIS — G47.33 OBSTRUCTIVE SLEEP APNEA (ADULT) (PEDIATRIC): ICD-10-CM

## 2025-04-11 LAB
A1C WITH ESTIMATED AVERAGE GLUCOSE RESULT: 7 % — HIGH (ref 4–5.6)
ANION GAP SERPL CALC-SCNC: 3 MMOL/L — LOW (ref 5–17)
APPEARANCE UR: CLEAR — SIGNIFICANT CHANGE UP
BILIRUB UR-MCNC: NEGATIVE — SIGNIFICANT CHANGE UP
BUN SERPL-MCNC: 35 MG/DL — HIGH (ref 7–23)
CALCIUM SERPL-MCNC: 9 MG/DL — SIGNIFICANT CHANGE UP (ref 8.5–10.1)
CHLORIDE SERPL-SCNC: 112 MMOL/L — HIGH (ref 96–108)
CO2 SERPL-SCNC: 27 MMOL/L — SIGNIFICANT CHANGE UP (ref 22–31)
COLOR SPEC: YELLOW — SIGNIFICANT CHANGE UP
CREAT SERPL-MCNC: 1.5 MG/DL — HIGH (ref 0.5–1.3)
DIFF PNL FLD: NEGATIVE — SIGNIFICANT CHANGE UP
EGFR: 46 ML/MIN/1.73M2 — LOW
EGFR: 46 ML/MIN/1.73M2 — LOW
ESTIMATED AVERAGE GLUCOSE: 154 MG/DL — HIGH (ref 68–114)
GLUCOSE SERPL-MCNC: 134 MG/DL — HIGH (ref 70–99)
GLUCOSE UR QL: 100 MG/DL
HCT VFR BLD CALC: 36.4 % — LOW (ref 39–50)
HGB BLD-MCNC: 11.7 G/DL — LOW (ref 13–17)
KETONES UR-MCNC: NEGATIVE MG/DL — SIGNIFICANT CHANGE UP
LEUKOCYTE ESTERASE UR-ACNC: NEGATIVE — SIGNIFICANT CHANGE UP
MCHC RBC-ENTMCNC: 29.7 PG — SIGNIFICANT CHANGE UP (ref 27–34)
MCHC RBC-ENTMCNC: 32.1 G/DL — SIGNIFICANT CHANGE UP (ref 32–36)
MCV RBC AUTO: 92.4 FL — SIGNIFICANT CHANGE UP (ref 80–100)
NITRITE UR-MCNC: NEGATIVE — SIGNIFICANT CHANGE UP
NRBC BLD AUTO-RTO: 0 /100 WBCS — SIGNIFICANT CHANGE UP (ref 0–0)
PH UR: 5 — SIGNIFICANT CHANGE UP (ref 5–8)
PLATELET # BLD AUTO: 336 K/UL — SIGNIFICANT CHANGE UP (ref 150–400)
POTASSIUM SERPL-MCNC: 4.8 MMOL/L — SIGNIFICANT CHANGE UP (ref 3.5–5.3)
POTASSIUM SERPL-SCNC: 4.8 MMOL/L — SIGNIFICANT CHANGE UP (ref 3.5–5.3)
PROT UR-MCNC: 100 MG/DL
RBC # BLD: 3.94 M/UL — LOW (ref 4.2–5.8)
RBC # FLD: 15.2 % — HIGH (ref 10.3–14.5)
SODIUM SERPL-SCNC: 142 MMOL/L — SIGNIFICANT CHANGE UP (ref 135–145)
SP GR SPEC: 1.01 — SIGNIFICANT CHANGE UP (ref 1–1.03)
UROBILINOGEN FLD QL: 0.2 MG/DL — SIGNIFICANT CHANGE UP (ref 0.2–1)
WBC # BLD: 7.18 K/UL — SIGNIFICANT CHANGE UP (ref 3.8–10.5)
WBC # FLD AUTO: 7.18 K/UL — SIGNIFICANT CHANGE UP (ref 3.8–10.5)

## 2025-04-11 PROCEDURE — 87086 URINE CULTURE/COLONY COUNT: CPT

## 2025-04-11 PROCEDURE — 85027 COMPLETE CBC AUTOMATED: CPT

## 2025-04-11 PROCEDURE — G0463: CPT

## 2025-04-11 PROCEDURE — 83036 HEMOGLOBIN GLYCOSYLATED A1C: CPT

## 2025-04-11 PROCEDURE — 93010 ELECTROCARDIOGRAM REPORT: CPT

## 2025-04-11 PROCEDURE — 93005 ELECTROCARDIOGRAM TRACING: CPT

## 2025-04-11 PROCEDURE — 80048 BASIC METABOLIC PNL TOTAL CA: CPT

## 2025-04-11 PROCEDURE — 81001 URINALYSIS AUTO W/SCOPE: CPT

## 2025-04-11 PROCEDURE — 36415 COLL VENOUS BLD VENIPUNCTURE: CPT

## 2025-04-11 NOTE — H&P PST ADULT - PROBLEM SELECTOR PLAN 1
scheduled for TUR bladder tumor w/Mitomycin on 4/23/25  Labs- CBC, BMP, UA, Urine C&S, EKG  Pre op instructions discussed, [id number] understanding  Pre op PCP evaluation

## 2025-04-11 NOTE — H&P PST ADULT - NSICDXPASTMEDICALHX_GEN_ALL_CORE_FT
PAST MEDICAL HISTORY:  COPD, mild     H/O emphysema     Hyperlipidemia     Hypertension     Malignant neoplasm of bladder     Prostate ca     Type 2 diabetes mellitus

## 2025-04-11 NOTE — H&P PST ADULT - NSICDXPROCEDURE_GEN_ALL_CORE_FT
PROCEDURES:  Transurethral resection of bladder tumor (TURBT) with cystoscopy and instillation of mitomycin C 11-Apr-2025 13:38:21  Kelly Briones

## 2025-04-11 NOTE — H&P PST ADULT - HISTORY OF PRESENT ILLNESS
80 yo M with h/o HTN, prostate cancer (2012, s/p Cyber knife Rx), ANDREW on CPAP, COPD, T2 DM c/o increased frequency urination x one year. Pt had urology evaluation- s/p uroflow- demonstrated bladder neoplasm. Pt scheduled for TUR bladder tumor w/Mitomycin on 4/23/25    **Denies any CP/SOB, hematuria or sick contacts

## 2025-04-12 LAB
CULTURE RESULTS: NO GROWTH — SIGNIFICANT CHANGE UP
SPECIMEN SOURCE: SIGNIFICANT CHANGE UP

## 2025-04-13 ENCOUNTER — OUTPATIENT (OUTPATIENT)
Dept: OUTPATIENT SERVICES | Facility: HOSPITAL | Age: 80
LOS: 1 days | End: 2025-04-13
Payer: MEDICARE

## 2025-04-13 DIAGNOSIS — Z98.89 OTHER SPECIFIED POSTPROCEDURAL STATES: Chronic | ICD-10-CM

## 2025-04-13 DIAGNOSIS — Z90.81 ACQUIRED ABSENCE OF SPLEEN: Chronic | ICD-10-CM

## 2025-04-13 DIAGNOSIS — R39.15 URGENCY OF URINATION: ICD-10-CM

## 2025-04-13 PROBLEM — C67.9 MALIGNANT NEOPLASM OF BLADDER, UNSPECIFIED: Chronic | Status: ACTIVE | Noted: 2025-04-11

## 2025-04-13 PROBLEM — J44.9 CHRONIC OBSTRUCTIVE PULMONARY DISEASE, UNSPECIFIED: Chronic | Status: ACTIVE | Noted: 2025-04-11

## 2025-04-13 PROBLEM — E11.9 TYPE 2 DIABETES MELLITUS WITHOUT COMPLICATIONS: Chronic | Status: ACTIVE | Noted: 2025-04-11

## 2025-04-13 PROBLEM — J43.9 EMPHYSEMA, UNSPECIFIED: Chronic | Status: ACTIVE | Noted: 2025-04-11

## 2025-04-13 PROCEDURE — 74178 CT ABD&PLV WO CNTR FLWD CNTR: CPT

## 2025-04-22 NOTE — ASU PATIENT PROFILE, ADULT - FALL HARM RISK - HARM RISK INTERVENTIONS

## 2025-04-23 ENCOUNTER — TRANSCRIPTION ENCOUNTER (OUTPATIENT)
Age: 80
End: 2025-04-23

## 2025-04-23 ENCOUNTER — APPOINTMENT (OUTPATIENT)
Dept: UROLOGY | Facility: HOSPITAL | Age: 80
End: 2025-04-23

## 2025-04-23 ENCOUNTER — OUTPATIENT (OUTPATIENT)
Dept: OUTPATIENT SERVICES | Facility: HOSPITAL | Age: 80
LOS: 1 days | End: 2025-04-23
Payer: MEDICARE

## 2025-04-23 VITALS
WEIGHT: 205.03 LBS | RESPIRATION RATE: 18 BRPM | HEART RATE: 51 BPM | TEMPERATURE: 98 F | HEIGHT: 67 IN | SYSTOLIC BLOOD PRESSURE: 156 MMHG | DIASTOLIC BLOOD PRESSURE: 64 MMHG | OXYGEN SATURATION: 95 %

## 2025-04-23 VITALS
RESPIRATION RATE: 16 BRPM | DIASTOLIC BLOOD PRESSURE: 65 MMHG | SYSTOLIC BLOOD PRESSURE: 140 MMHG | OXYGEN SATURATION: 96 % | HEART RATE: 49 BPM

## 2025-04-23 DIAGNOSIS — Z90.81 ACQUIRED ABSENCE OF SPLEEN: Chronic | ICD-10-CM

## 2025-04-23 DIAGNOSIS — Z98.89 OTHER SPECIFIED POSTPROCEDURAL STATES: Chronic | ICD-10-CM

## 2025-04-23 DIAGNOSIS — D49.4 NEOPLASM OF UNSPECIFIED BEHAVIOR OF BLADDER: ICD-10-CM

## 2025-04-23 PROCEDURE — 52235 CYSTOSCOPY AND TREATMENT: CPT

## 2025-04-23 PROCEDURE — 82962 GLUCOSE BLOOD TEST: CPT

## 2025-04-23 PROCEDURE — 88307 TISSUE EXAM BY PATHOLOGIST: CPT | Mod: 26

## 2025-04-23 PROCEDURE — 88307 TISSUE EXAM BY PATHOLOGIST: CPT

## 2025-04-23 PROCEDURE — C9399: CPT

## 2025-04-23 RX ORDER — SODIUM CHLORIDE 9 G/1000ML
1000 INJECTION, SOLUTION INTRAVENOUS
Refills: 0 | Status: DISCONTINUED | OUTPATIENT
Start: 2025-04-23 | End: 2025-04-23

## 2025-04-23 RX ORDER — OXYCODONE HYDROCHLORIDE 30 MG/1
5 TABLET ORAL ONCE
Refills: 0 | Status: DISCONTINUED | OUTPATIENT
Start: 2025-04-23 | End: 2025-04-23

## 2025-04-23 RX ORDER — ATORVASTATIN CALCIUM 80 MG/1
1 TABLET, FILM COATED ORAL
Refills: 0 | DISCHARGE

## 2025-04-23 RX ORDER — TAMSULOSIN HYDROCHLORIDE 0.4 MG/1
2 CAPSULE ORAL
Refills: 0 | DISCHARGE

## 2025-04-23 RX ORDER — METOPROLOL SUCCINATE 50 MG/1
1 TABLET, EXTENDED RELEASE ORAL
Refills: 0 | DISCHARGE

## 2025-04-23 RX ORDER — METFORMIN HYDROCHLORIDE 850 MG/1
1 TABLET ORAL
Refills: 0 | DISCHARGE

## 2025-04-23 RX ORDER — CEPHALEXIN 250 MG/1
1 CAPSULE ORAL
Qty: 6 | Refills: 0
Start: 2025-04-23 | End: 2025-04-25

## 2025-04-23 RX ORDER — UBIDECARENONE 100 MG
1 CAPSULE ORAL
Refills: 0 | DISCHARGE

## 2025-04-23 RX ORDER — FLUTICASONE FUROATE AND VILANTEROL TRIFENATATE 100; 25 UG/1; UG/1
1 POWDER RESPIRATORY (INHALATION)
Refills: 0 | DISCHARGE

## 2025-04-23 RX ORDER — HYDROMORPHONE/SOD CHLOR,ISO/PF 2 MG/10 ML
0.5 SYRINGE (ML) INJECTION
Refills: 0 | Status: DISCONTINUED | OUTPATIENT
Start: 2025-04-23 | End: 2025-04-23

## 2025-04-23 RX ORDER — ONDANSETRON HCL/PF 4 MG/2 ML
4 VIAL (ML) INJECTION ONCE
Refills: 0 | Status: DISCONTINUED | OUTPATIENT
Start: 2025-04-23 | End: 2025-04-23

## 2025-04-23 RX ORDER — PHENAZOPYRIDINE HCL 100 MG
1 TABLET ORAL
Qty: 9 | Refills: 0
Start: 2025-04-23 | End: 2025-04-25

## 2025-04-23 RX ORDER — CEFAZOLIN SODIUM IN 0.9 % NACL 3 G/100 ML
2000 INTRAVENOUS SOLUTION, PIGGYBACK (ML) INTRAVENOUS ONCE
Refills: 0 | Status: COMPLETED | OUTPATIENT
Start: 2025-04-23 | End: 2025-04-23

## 2025-04-23 RX ADMIN — SODIUM CHLORIDE 60 MILLILITER(S): 9 INJECTION, SOLUTION INTRAVENOUS at 07:13

## 2025-04-23 RX ADMIN — SODIUM CHLORIDE 75 MILLILITER(S): 9 INJECTION, SOLUTION INTRAVENOUS at 09:34

## 2025-04-23 NOTE — ASU DISCHARGE PLAN (ADULT/PEDIATRIC) - COMMENTS
May take Tylenol ay 2:30 pm today or layer for pain or fever...you received Tylenol at 8:30 am today in the operating room.

## 2025-04-23 NOTE — ASU DISCHARGE PLAN (ADULT/PEDIATRIC) - FINANCIAL ASSISTANCE
[facility] provides services at a reduced cost to those who are determined to be eligible through [facility]’s financial assistance program. Information regarding [facility]’s financial assistance program can be found by going to [email or web address] or by calling [phone].

## 2025-04-23 NOTE — ASU DISCHARGE PLAN (ADULT/PEDIATRIC) - ASU DC SPECIAL INSTRUCTIONSFT
You may resume your regular activity level.  If any issues please call Dr. Mckeon's office.    You may drive a vehicle.    You may return to work, if still working.      You may take 2 Tylenol Extra Strength 500mg OTC   Please take Keflex 500mg twice a day as prescribed. Please take Pyridium as prescribed.     Please call Dr. Mckeon 's office for follow up in 1 week.      You are encouraged to walk as much as possible to prevent blood clots in the legs, to maintain lung health after surgery/anesthesia, and to prevent constipation after surgery.      Continue to use the incentive spirometer at home.

## 2025-05-01 ENCOUNTER — APPOINTMENT (OUTPATIENT)
Dept: UROLOGY | Facility: CLINIC | Age: 80
End: 2025-05-01
Payer: MEDICARE

## 2025-05-01 PROCEDURE — 51741 ELECTRO-UROFLOWMETRY FIRST: CPT

## 2025-05-01 PROCEDURE — 51798 US URINE CAPACITY MEASURE: CPT

## 2025-05-01 PROCEDURE — 99214 OFFICE O/P EST MOD 30 MIN: CPT

## 2025-06-03 ENCOUNTER — APPOINTMENT (OUTPATIENT)
Dept: PULMONOLOGY | Facility: CLINIC | Age: 80
End: 2025-06-03
Payer: MEDICARE

## 2025-06-03 VITALS
HEART RATE: 54 BPM | OXYGEN SATURATION: 95 % | BODY MASS INDEX: 33.43 KG/M2 | RESPIRATION RATE: 16 BRPM | SYSTOLIC BLOOD PRESSURE: 154 MMHG | DIASTOLIC BLOOD PRESSURE: 62 MMHG | WEIGHT: 208 LBS | HEIGHT: 66 IN

## 2025-06-03 DIAGNOSIS — G47.33 OBSTRUCTIVE SLEEP APNEA (ADULT) (PEDIATRIC): ICD-10-CM

## 2025-06-03 DIAGNOSIS — J44.9 CHRONIC OBSTRUCTIVE PULMONARY DISEASE, UNSPECIFIED: ICD-10-CM

## 2025-06-03 DIAGNOSIS — Z87.891 PERSONAL HISTORY OF NICOTINE DEPENDENCE: ICD-10-CM

## 2025-06-03 DIAGNOSIS — R06.09 OTHER FORMS OF DYSPNEA: ICD-10-CM

## 2025-06-03 DIAGNOSIS — E66.9 OBESITY, UNSPECIFIED: ICD-10-CM

## 2025-06-03 DIAGNOSIS — J43.9 EMPHYSEMA, UNSPECIFIED: ICD-10-CM

## 2025-06-03 PROCEDURE — 99214 OFFICE O/P EST MOD 30 MIN: CPT

## 2025-06-03 PROCEDURE — G2211 COMPLEX E/M VISIT ADD ON: CPT

## 2025-06-05 ENCOUNTER — APPOINTMENT (OUTPATIENT)
Dept: UROLOGY | Facility: CLINIC | Age: 80
End: 2025-06-05
Payer: MEDICARE

## 2025-06-05 DIAGNOSIS — R35.0 FREQUENCY OF MICTURITION: ICD-10-CM

## 2025-06-05 PROCEDURE — 51798 US URINE CAPACITY MEASURE: CPT

## 2025-06-05 PROCEDURE — 99214 OFFICE O/P EST MOD 30 MIN: CPT

## 2025-06-05 PROCEDURE — 51741 ELECTRO-UROFLOWMETRY FIRST: CPT

## 2025-06-20 ENCOUNTER — APPOINTMENT (OUTPATIENT)
Dept: UROLOGY | Facility: CLINIC | Age: 80
End: 2025-06-20
Payer: MEDICARE

## 2025-06-20 PROCEDURE — 99214 OFFICE O/P EST MOD 30 MIN: CPT

## 2025-06-20 PROCEDURE — 51741 ELECTRO-UROFLOWMETRY FIRST: CPT

## 2025-06-20 PROCEDURE — 51798 US URINE CAPACITY MEASURE: CPT

## 2025-06-20 RX ORDER — MIRABEGRON 50 MG/1
50 TABLET, EXTENDED RELEASE ORAL
Qty: 30 | Refills: 3 | Status: ACTIVE | COMMUNITY
Start: 2025-06-20 | End: 1900-01-01

## 2025-07-07 RX ORDER — TAMSULOSIN HYDROCHLORIDE 0.4 MG/1
0.4 CAPSULE ORAL
Qty: 90 | Refills: 0 | Status: ACTIVE | COMMUNITY
Start: 2025-07-07 | End: 1900-01-01

## 2025-07-25 ENCOUNTER — APPOINTMENT (OUTPATIENT)
Dept: UROLOGY | Facility: CLINIC | Age: 80
End: 2025-07-25
Payer: MEDICARE

## 2025-07-25 VITALS
HEIGHT: 66 IN | OXYGEN SATURATION: 96 % | DIASTOLIC BLOOD PRESSURE: 70 MMHG | RESPIRATION RATE: 16 BRPM | WEIGHT: 218 LBS | SYSTOLIC BLOOD PRESSURE: 153 MMHG | TEMPERATURE: 98.3 F | BODY MASS INDEX: 35.03 KG/M2 | HEART RATE: 57 BPM

## 2025-07-25 PROCEDURE — 51741 ELECTRO-UROFLOWMETRY FIRST: CPT

## 2025-07-25 PROCEDURE — 51784 ANAL/URINARY MUSCLE STUDY: CPT

## 2025-07-25 PROCEDURE — 52000 CYSTOURETHROSCOPY: CPT

## 2025-07-25 PROCEDURE — 51728 CYSTOMETROGRAM W/VP: CPT

## 2025-07-25 PROCEDURE — 99214 OFFICE O/P EST MOD 30 MIN: CPT | Mod: 25

## 2025-07-25 PROCEDURE — 51797 INTRAABDOMINAL PRESSURE TEST: CPT

## 2025-08-15 ENCOUNTER — APPOINTMENT (OUTPATIENT)
Dept: UROLOGY | Facility: CLINIC | Age: 80
End: 2025-08-15

## 2025-08-29 ENCOUNTER — APPOINTMENT (OUTPATIENT)
Dept: UROLOGY | Facility: CLINIC | Age: 80
End: 2025-08-29
Payer: MEDICARE

## 2025-08-29 VITALS
BODY MASS INDEX: 35.2 KG/M2 | WEIGHT: 219 LBS | SYSTOLIC BLOOD PRESSURE: 163 MMHG | RESPIRATION RATE: 16 BRPM | OXYGEN SATURATION: 96 % | TEMPERATURE: 98.4 F | DIASTOLIC BLOOD PRESSURE: 76 MMHG | HEART RATE: 62 BPM | HEIGHT: 66 IN

## 2025-08-29 VITALS — SYSTOLIC BLOOD PRESSURE: 112 MMHG | DIASTOLIC BLOOD PRESSURE: 62 MMHG

## 2025-08-29 VITALS — DIASTOLIC BLOOD PRESSURE: 76 MMHG | SYSTOLIC BLOOD PRESSURE: 153 MMHG | OXYGEN SATURATION: 95 % | HEART RATE: 57 BPM

## 2025-08-29 VITALS — DIASTOLIC BLOOD PRESSURE: 70 MMHG | SYSTOLIC BLOOD PRESSURE: 142 MMHG

## 2025-08-29 PROCEDURE — 99214 OFFICE O/P EST MOD 30 MIN: CPT | Mod: 25

## 2025-08-29 PROCEDURE — 64566 NEUROELTRD STIM POST TIBIAL: CPT

## 2025-08-29 RX ORDER — SULFAMETHOXAZOLE AND TRIMETHOPRIM 800; 160 MG/1; MG/1
800-160 TABLET ORAL
Qty: 1 | Refills: 0 | Status: COMPLETED | OUTPATIENT
Start: 2025-08-29 | End: 2025-08-30

## 2025-08-29 RX ORDER — SULFAMETHOXAZOLE AND TRIMETHOPRIM 800; 160 MG/1; MG/1
800-160 TABLET ORAL
Refills: 0 | Status: COMPLETED | OUTPATIENT
Start: 2025-08-29

## 2025-08-29 RX ADMIN — SULFAMETHOXAZOLE AND TRIMETHOPRIM 0 MG: 800; 160 TABLET ORAL at 00:00

## 2025-09-02 ENCOUNTER — APPOINTMENT (OUTPATIENT)
Dept: UROLOGY | Facility: CLINIC | Age: 80
End: 2025-09-02
Payer: MEDICARE

## 2025-09-02 VITALS
RESPIRATION RATE: 16 BRPM | WEIGHT: 218 LBS | OXYGEN SATURATION: 95 % | HEIGHT: 66 IN | DIASTOLIC BLOOD PRESSURE: 69 MMHG | TEMPERATURE: 97.8 F | SYSTOLIC BLOOD PRESSURE: 147 MMHG | BODY MASS INDEX: 35.03 KG/M2 | HEART RATE: 62 BPM

## 2025-09-02 PROCEDURE — 99214 OFFICE O/P EST MOD 30 MIN: CPT

## 2025-09-09 ENCOUNTER — APPOINTMENT (OUTPATIENT)
Dept: UROLOGY | Facility: CLINIC | Age: 80
End: 2025-09-09
Payer: MEDICARE

## 2025-09-09 PROCEDURE — 99213 OFFICE O/P EST LOW 20 MIN: CPT | Mod: 93

## (undated) DEVICE — FOLEY CATH 3-WAY 22FR 30CC LATEX LUBRICATH

## (undated) DEVICE — PACK CYSTO

## (undated) DEVICE — SOL IRR POUR H2O 1000ML

## (undated) DEVICE — VENODYNE/SCD SLEEVE CALF MEDIUM

## (undated) DEVICE — SOL IRR BAG NS 0.9% 3000ML

## (undated) DEVICE — DRAINAGE BAG URINARY 4L

## (undated) DEVICE — WARMING BLANKET UPPER ADULT

## (undated) DEVICE — VENODYNE/SCD SLEEVE CALF LARGE

## (undated) DEVICE — FOLEY CATH 3-WAY 24FR 30CC LATEX LUBRICATH

## (undated) DEVICE — ELCTR CUTTING 22/24FR

## (undated) DEVICE — DRAPE DRAINAGE BAG URO CATCH II

## (undated) DEVICE — Device

## (undated) DEVICE — SOL IRR BAG H2O 3000ML

## (undated) DEVICE — GLV 8 PROTEXIS (WHITE)

## (undated) DEVICE — DRAINAGE BAG URINARY 2L

## (undated) DEVICE — FOLEY HOLDER STATLOCK 2 WAY ADULT

## (undated) DEVICE — ELCTR CUTTING 24/26FR

## (undated) DEVICE — UROVAC

## (undated) DEVICE — PLV-SCD MACHINE: Type: DURABLE MEDICAL EQUIPMENT